# Patient Record
Sex: MALE | Race: WHITE | NOT HISPANIC OR LATINO | Employment: OTHER
[De-identification: names, ages, dates, MRNs, and addresses within clinical notes are randomized per-mention and may not be internally consistent; named-entity substitution may affect disease eponyms.]

---

## 2018-08-09 ENCOUNTER — RECORDS - HEALTHEAST (OUTPATIENT)
Dept: ADMINISTRATIVE | Facility: OTHER | Age: 78
End: 2018-08-09

## 2018-08-16 ENCOUNTER — HOSPITAL ENCOUNTER (OUTPATIENT)
Dept: RADIOLOGY | Facility: HOSPITAL | Age: 78
Discharge: HOME | End: 2018-08-16
Attending: OTOLARYNGOLOGY

## 2018-08-16 DIAGNOSIS — R13.10 DYSPHAGIA, UNSPECIFIED TYPE: ICD-10-CM

## 2018-08-16 DIAGNOSIS — Z85.818 PERSONAL HISTORY OF MALIGNANT NEOPLASM OF OTHER SITES OF LIP, ORAL CAVITY, AND PHARYNX: ICD-10-CM

## 2021-05-28 ENCOUNTER — RECORDS - HEALTHEAST (OUTPATIENT)
Dept: ADMINISTRATIVE | Facility: CLINIC | Age: 81
End: 2021-05-28

## 2021-05-29 ENCOUNTER — RECORDS - HEALTHEAST (OUTPATIENT)
Dept: ADMINISTRATIVE | Facility: CLINIC | Age: 81
End: 2021-05-29

## 2021-06-19 NOTE — PROGRESS NOTES
"Speech Language/Pathology  Videofluoroscopic Swallow Study       Problem:  There is no problem list on file for this patient.      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 Patient reports that he \"gags a lot\" when eating. He is able to swallow without difficulty one day, and then can have significant trouble the next. Per his wife, \"Some days, he can't get anything down. It's very frustrating.\" When asked about his eating behaviors, wife comments that patient eats fast and takes large bites. He seems to do better with softer, moister foods (e.g., soups). Foods like breads and salads are especially problematic. Patient also coughs at times when taking pills. The purpose of this study is to evaluate the oropharyngeal phase of patient's swallow, determine his aspiration risk, and establish safe swallowing strategies as appropriate. Patient also participated in an esophagram during this visit. Please refer to separate report for details.    Patient presents as alert and cooperative during this evaluation, but was disengaged at times (i.e., looking around room, not attending to therapist). His wife, Ena, observed study and was present afterwards to receive education regarding results and recommendations.    An  was not applicable    Patient was given honey-thick, puree, nectar-thick, and thin consistencies of barium.    Oral Phase:    Dentition/Oral hygiene: Patient wears upper and lower partials. Oral hygiene was adequate.    Bolus prep and oral control were mildly impaired. Brief bolus holding was noted with all consistencies. During this pause, patient appeared to be preparing himself to initiate the swallow. " Intermittent piecemeal deglutition noted with the more viscous consistencies (i.e., honey-thick and puree).     Anterior-Posterior transit was mildly impaired and effortful with puree consistency.    Premature spillage occurred with honey-thick liquid.    Tongue base retraction was mildly impaired.    Mild oral stasis was noted along the tongue base with all consistencies. This was most notable following boluses of puree, but cleared when patient completed spontaneous dry swallows.     Pharyngeal Phase:    Aspiration did not occur with any consistency.    Laryngeal penetration did not occur with any consistency.    Swallow response was timely with all consistencies. Pourover was not observed.    Epiglottic movement was complete consistently across texture trials.    Mild pharyngeal stasis was observed in the valleculae following boluses of each consistency. Again, this was most notable following boluses of puree, but cleared with throat clearing and spontaneous dry swallows.    Pharyngeal constriction was not impaired.    Hyolaryngeal elevation was not impaired. Hyolaryngeal excursion was mildly reduced.    Cricopharyngeal function was not impaired, though patient was noted to have a mildly prominent cricopharyngeus muscle. Cervical esophageal function was not impaired.    Assessment:    Patient demonstrated mild oral dysphagia. Suspect this is a delayed side effect of prior chemoradiation therapy for tonsil cancer. No significant pharyngeal dysphagia was observed.    Patient was noted to frequently clear his throat throughout study. This appeared to be his way of clearing stasis along the base of the tongue and in the valleculae. Throat clearing was not related to aspiration or laryngeal penetration, as neither was observed during this evaluation.    Patient is at low aspiration risk with all intake.    Rehab potential is fair to good based on prior level of function, evaluation results and motivation and  cooperation.    Recommendations:    Plan: Continue current diet of Regular textures and thin liquids. Recommend that patient choose softer foods, moistened with sauces, gravies, dressings, or condiments.    Strategies: Patient to sit fully upright for all intake, eat slowly, and take one small (~1 tsp) bite or sip at a time. Patient to also alternate a bite of food with a sip of liquid. Patient to avoid talking and other distractions (e.g., watching TV) when eating and drinking. Recommend that patient also take pills one at a time, mixed in puree (e.g., applesauce, yogurt, pudding). Patient was provided with verbal education and a written handout on these strategies.    Speech Therapy is not recommended at this time    Referrals: Patient to continue to follow with [facility] as needed    Reviewed history of swallow problem with patient and wife, and verbally explained roles of SLP and radiologist. Verbally explained process of VFSS prior to administration of barium. Verbally explained results and recommendations to patient and wife. SLP answered questions and stated that a copy of this report will be faxed to patient's referring provider, Dr. Dorman of [facility]. Patient and wife verbalized understanding.    30 dysphagia minutes    Callie Foreman MA, CCC-SLP

## 2023-04-13 ENCOUNTER — APPOINTMENT (OUTPATIENT)
Dept: CT IMAGING | Facility: CLINIC | Age: 83
End: 2023-04-13
Attending: FAMILY MEDICINE
Payer: COMMERCIAL

## 2023-04-13 ENCOUNTER — HOSPITAL ENCOUNTER (EMERGENCY)
Facility: CLINIC | Age: 83
Discharge: HOME | End: 2023-04-13
Attending: FAMILY MEDICINE | Admitting: FAMILY MEDICINE
Payer: COMMERCIAL

## 2023-04-13 VITALS
HEIGHT: 65 IN | SYSTOLIC BLOOD PRESSURE: 158 MMHG | BODY MASS INDEX: 26.66 KG/M2 | DIASTOLIC BLOOD PRESSURE: 83 MMHG | HEART RATE: 72 BPM | WEIGHT: 160 LBS | TEMPERATURE: 97.9 F | OXYGEN SATURATION: 97 %

## 2023-04-13 DIAGNOSIS — R10.84 ABDOMINAL PAIN, GENERALIZED: ICD-10-CM

## 2023-04-13 PROBLEM — G43.909 MIGRAINE HEADACHE: Status: ACTIVE | Noted: 2023-04-13

## 2023-04-13 PROBLEM — K21.9 GASTROESOPHAGEAL REFLUX DISEASE: Status: ACTIVE | Noted: 2023-04-13

## 2023-04-13 PROBLEM — K44.9 DIAPHRAGMATIC HERNIA: Status: ACTIVE | Noted: 2023-04-13

## 2023-04-13 PROBLEM — C09.9 MALIGNANT NEOPLASM OF TONSIL (H): Status: ACTIVE | Noted: 2023-04-13

## 2023-04-13 PROBLEM — G47.33 OSA (OBSTRUCTIVE SLEEP APNEA): Status: ACTIVE | Noted: 2017-10-25

## 2023-04-13 PROBLEM — R33.8 ACUTE URINARY RETENTION: Status: ACTIVE | Noted: 2018-11-05

## 2023-04-13 LAB
ALBUMIN SERPL BCG-MCNC: 4.3 G/DL (ref 3.5–5.2)
ALBUMIN UR-MCNC: NEGATIVE MG/DL
ALP SERPL-CCNC: 88 U/L (ref 40–129)
ALT SERPL W P-5'-P-CCNC: 16 U/L (ref 10–50)
ANION GAP SERPL CALCULATED.3IONS-SCNC: 9 MMOL/L (ref 7–15)
APPEARANCE UR: CLEAR
AST SERPL W P-5'-P-CCNC: 22 U/L (ref 10–50)
BASOPHILS # BLD AUTO: 0 10E3/UL (ref 0–0.2)
BASOPHILS NFR BLD AUTO: 0 %
BILIRUB SERPL-MCNC: 0.4 MG/DL
BILIRUB UR QL STRIP: NEGATIVE
BUN SERPL-MCNC: 21.1 MG/DL (ref 8–23)
CALCIUM SERPL-MCNC: 9.3 MG/DL (ref 8.8–10.2)
CHLORIDE SERPL-SCNC: 108 MMOL/L (ref 98–107)
COLOR UR AUTO: ABNORMAL
CREAT SERPL-MCNC: 1.07 MG/DL (ref 0.67–1.17)
DEPRECATED HCO3 PLAS-SCNC: 27 MMOL/L (ref 22–29)
EOSINOPHIL # BLD AUTO: 0.2 10E3/UL (ref 0–0.7)
EOSINOPHIL NFR BLD AUTO: 3 %
ERYTHROCYTE [DISTWIDTH] IN BLOOD BY AUTOMATED COUNT: 12.9 % (ref 10–15)
GFR SERPL CREATININE-BSD FRML MDRD: 70 ML/MIN/1.73M2
GLUCOSE SERPL-MCNC: 102 MG/DL (ref 70–99)
GLUCOSE UR STRIP-MCNC: NEGATIVE MG/DL
HCT VFR BLD AUTO: 41.1 % (ref 40–53)
HGB BLD-MCNC: 13 G/DL (ref 13.3–17.7)
HGB UR QL STRIP: NEGATIVE
IMM GRANULOCYTES # BLD: 0 10E3/UL
IMM GRANULOCYTES NFR BLD: 0 %
KETONES UR STRIP-MCNC: NEGATIVE MG/DL
LACTATE SERPL-SCNC: 0.7 MMOL/L (ref 0.7–2)
LEUKOCYTE ESTERASE UR QL STRIP: NEGATIVE
LIPASE SERPL-CCNC: 25 U/L (ref 13–60)
LYMPHOCYTES # BLD AUTO: 1.4 10E3/UL (ref 0.8–5.3)
LYMPHOCYTES NFR BLD AUTO: 21 %
MCH RBC QN AUTO: 29.8 PG (ref 26.5–33)
MCHC RBC AUTO-ENTMCNC: 31.6 G/DL (ref 31.5–36.5)
MCV RBC AUTO: 94 FL (ref 78–100)
MONOCYTES # BLD AUTO: 0.7 10E3/UL (ref 0–1.3)
MONOCYTES NFR BLD AUTO: 11 %
MUCOUS THREADS #/AREA URNS LPF: PRESENT /LPF
NEUTROPHILS # BLD AUTO: 4.4 10E3/UL (ref 1.6–8.3)
NEUTROPHILS NFR BLD AUTO: 65 %
NITRATE UR QL: NEGATIVE
NRBC # BLD AUTO: 0 10E3/UL
NRBC BLD AUTO-RTO: 0 /100
PH UR STRIP: 8 [PH] (ref 5–7)
PLATELET # BLD AUTO: 144 10E3/UL (ref 150–450)
POTASSIUM SERPL-SCNC: 4.2 MMOL/L (ref 3.4–5.3)
PROT SERPL-MCNC: 7 G/DL (ref 6.4–8.3)
RBC # BLD AUTO: 4.36 10E6/UL (ref 4.4–5.9)
RBC URINE: 3 /HPF
SODIUM SERPL-SCNC: 144 MMOL/L (ref 136–145)
SP GR UR STRIP: 1.02 (ref 1–1.03)
UROBILINOGEN UR STRIP-MCNC: NORMAL MG/DL
WBC # BLD AUTO: 6.7 10E3/UL (ref 4–11)
WBC URINE: <1 /HPF

## 2023-04-13 PROCEDURE — 85025 COMPLETE CBC W/AUTO DIFF WBC: CPT | Performed by: FAMILY MEDICINE

## 2023-04-13 PROCEDURE — 36415 COLL VENOUS BLD VENIPUNCTURE: CPT | Performed by: FAMILY MEDICINE

## 2023-04-13 PROCEDURE — 250N000009 HC RX 250: Performed by: FAMILY MEDICINE

## 2023-04-13 PROCEDURE — 81001 URINALYSIS AUTO W/SCOPE: CPT | Performed by: FAMILY MEDICINE

## 2023-04-13 PROCEDURE — 80053 COMPREHEN METABOLIC PANEL: CPT | Performed by: FAMILY MEDICINE

## 2023-04-13 PROCEDURE — 93010 ELECTROCARDIOGRAM REPORT: CPT | Performed by: FAMILY MEDICINE

## 2023-04-13 PROCEDURE — 93005 ELECTROCARDIOGRAM TRACING: CPT | Performed by: FAMILY MEDICINE

## 2023-04-13 PROCEDURE — 99284 EMERGENCY DEPT VISIT MOD MDM: CPT | Mod: 25 | Performed by: FAMILY MEDICINE

## 2023-04-13 PROCEDURE — 83690 ASSAY OF LIPASE: CPT | Performed by: FAMILY MEDICINE

## 2023-04-13 PROCEDURE — 74177 CT ABD & PELVIS W/CONTRAST: CPT

## 2023-04-13 PROCEDURE — 250N000011 HC RX IP 250 OP 636: Performed by: FAMILY MEDICINE

## 2023-04-13 PROCEDURE — 99285 EMERGENCY DEPT VISIT HI MDM: CPT | Mod: 25 | Performed by: FAMILY MEDICINE

## 2023-04-13 PROCEDURE — 250N000013 HC RX MED GY IP 250 OP 250 PS 637: Performed by: FAMILY MEDICINE

## 2023-04-13 PROCEDURE — 83605 ASSAY OF LACTIC ACID: CPT | Performed by: FAMILY MEDICINE

## 2023-04-13 RX ORDER — IOPAMIDOL 755 MG/ML
71 INJECTION, SOLUTION INTRAVASCULAR ONCE
Status: COMPLETED | OUTPATIENT
Start: 2023-04-13 | End: 2023-04-13

## 2023-04-13 RX ORDER — ACETAMINOPHEN 325 MG/1
650 TABLET ORAL ONCE
Status: COMPLETED | OUTPATIENT
Start: 2023-04-13 | End: 2023-04-13

## 2023-04-13 RX ADMIN — ACETAMINOPHEN 650 MG: 325 TABLET, FILM COATED ORAL at 12:24

## 2023-04-13 RX ADMIN — IOPAMIDOL 71 ML: 755 INJECTION, SOLUTION INTRAVENOUS at 12:57

## 2023-04-13 RX ADMIN — SODIUM CHLORIDE 59 ML: 9 INJECTION, SOLUTION INTRAVENOUS at 12:57

## 2023-04-13 ASSESSMENT — ENCOUNTER SYMPTOMS
FLANK PAIN: 1
HEADACHES: 0
NAUSEA: 0
DYSURIA: 0
ABDOMINAL PAIN: 0
VOMITING: 0
FEVER: 0
CONSTIPATION: 0
DIARRHEA: 0
WHEEZING: 0
CHILLS: 0
DIAPHORESIS: 0
BLOOD IN STOOL: 0
SHORTNESS OF BREATH: 0
COUGH: 0
SINUS PRESSURE: 0
PALPITATIONS: 0
FREQUENCY: 0
SORE THROAT: 0

## 2023-04-13 ASSESSMENT — ACTIVITIES OF DAILY LIVING (ADL): ADLS_ACUITY_SCORE: 35

## 2023-04-13 NOTE — ED NOTES
"Pt states that his abdominal pain has become \"a lot sharper\" within the past few weeks, waking him up at night. Pt also reports R sided flank pain that radiates to RUQ. Pt reports occasional nausea that occurs randomly but denies at this time. Pt denies diarrhea or urinary symptoms.     Pt came because \"all I came in for is a cat scan\".   "

## 2023-04-13 NOTE — DISCHARGE INSTRUCTIONS
ICD-10-CM    1. Abdominal pain, generalized  R10.84     no serious findings on imaging.  I suspect this is a result of stool accumulation and recommned a bowel regimen - taking miralax 1 capful in 8 oiz fluid daily for 7 days.  stay hydrated with 64 oz fluid/day

## 2023-04-13 NOTE — ED TRIAGE NOTES
LLQ and flank pain that is achy in nature with on/off sharp pain.  Pain for more than 2 months.  No n//v/d.  No fevers.  Pt concerned now because his brother was dx with kidney cancer.      Triage Assessment     Row Name 04/13/23 1103       Triage Assessment (Adult)    Airway WDL WDL       Respiratory WDL    Respiratory WDL WDL       Skin Circulation/Temperature WDL    Skin Circulation/Temperature WDL WDL       Cardiac WDL    Cardiac WDL WDL       Peripheral/Neurovascular WDL    Peripheral Neurovascular WDL WDL       Cognitive/Neuro/Behavioral WDL    Cognitive/Neuro/Behavioral WDL WDL

## 2023-04-13 NOTE — ED PROVIDER NOTES
History     Chief Complaint   Patient presents with     Abdominal Pain     HPI  Lalo Patel Sr. is a 82 year old male who presents with a history of urinary retention obstructive sleep apnea coronary disease prior tonsillar malignancy.  He presents here with 3 to 4 months of left lower quadrant and flank pain worsening.  Moderate pain on arrival here no associated nausea vomiting.  No changes in stools no diarrhea constipation no blood in the stool black tarry stools.  No hematemesis.  No dysuria urgency frequency or hematuria.  No trauma.  No known diverticulitis.  No known history of AAA.      Allergies:  No Known Allergies    Problem List:    Patient Active Problem List    Diagnosis Date Noted     Diaphragmatic hernia 04/13/2023     Priority: Medium     Gastroesophageal reflux disease 04/13/2023     Priority: Medium     Oct 22, 2002 Entered By: EMELINA PINEDA Comment: hiatal hernia       Malignant neoplasm of tonsil (H) 04/13/2023     Priority: Medium     Migraine headache 04/13/2023     Priority: Medium     Acute urinary retention 11/05/2018     Priority: Medium     NICO (obstructive sleep apnea) 10/25/2017     Priority: Medium     Coronary artery disease involving native coronary artery of native heart with angina pectoris (H) 04/05/2016     Priority: Medium        Past Medical History:    No past medical history on file.    Past Surgical History:    Past Surgical History:   Procedure Laterality Date     CORONARY STENT PLACEMENT       IR MISCELLANEOUS PROCEDURE  2/11/2005     IR MISCELLANEOUS PROCEDURE  2/11/2005     IR MISCELLANEOUS PROCEDURE  2/25/2005     IR MISCELLANEOUS PROCEDURE  11/3/2005     SKIN CANCER EXCISION       TONSILLECTOMY       WISDOM TOOTH EXTRACTION         Family History:    Family History   Problem Relation Age of Onset     Diabetes Mother      Glaucoma Father      Renal tubular acidosis Brother      Hypertension Son      Hernia Brother      Diabetes Brother      Hypertension Daughter   "    Migraines Daughter        Social History:  Marital Status:   [2]  Social History     Tobacco Use     Smoking status: Former     Types: Cigarettes     Quit date: 1988     Years since quittin.7     Smokeless tobacco: Never   Substance Use Topics     Alcohol use: No     Drug use: No        Medications:    levothyroxine (SYNTHROID, LEVOTHROID) 75 MCG tablet  metoprolol succinate (TOPROL-XL) 50 MG 24 hr tablet  omeprazole (PRILOSEC) 20 MG capsule  ranitidine (ZANTAC) 150 MG capsule          Review of Systems   Constitutional: Negative for chills, diaphoresis and fever.   HENT: Negative for ear pain, sinus pressure and sore throat.    Eyes: Negative for visual disturbance.   Respiratory: Negative for cough, shortness of breath and wheezing.    Cardiovascular: Positive for chest pain. Negative for palpitations.   Gastrointestinal: Negative for abdominal pain, blood in stool, constipation, diarrhea, nausea and vomiting.   Genitourinary: Positive for flank pain. Negative for dysuria, frequency and urgency.   Skin: Negative for rash.   Neurological: Negative for headaches.   All other systems reviewed and are negative.      Physical Exam   BP: (!) 151/75  Pulse: 70  Temp: 97.9  F (36.6  C)  Height: 165.1 cm (5' 5\")  Weight: 72.6 kg (160 lb)  SpO2: 98 %      Physical Exam  Constitutional:       General: He is in acute distress.      Appearance: He is not diaphoretic.   HENT:      Head: Atraumatic.   Cardiovascular:      Rate and Rhythm: Normal rate and regular rhythm.      Heart sounds: No murmur heard.  Pulmonary:      Effort: No respiratory distress.      Breath sounds: No stridor. No wheezing or rhonchi.   Abdominal:      General: Abdomen is flat. There is no distension.      Palpations: There is no mass.      Tenderness: There is abdominal tenderness. There is left CVA tenderness. There is no right CVA tenderness or guarding.   Musculoskeletal:      Cervical back: Neck supple.      Right lower leg: No " edema.      Left lower leg: No edema.   Skin:     Coloration: Skin is not pale.      Findings: No rash.   Neurological:      Mental Status: He is alert.         ED Course                 Procedures                EKG Interpretation:      Interpreted by Patel Vargas MD  EKG done at 1235 hrs. demonstrates a sinus rhythm 71 bpm normal axis.  There is no ST change.  There is no T wave change.  Normal R progression and no Q waves.  Normal intervals.  Normal conduction.  Are several PVCs.  Impression sinus rhythm 71 bpm no acute change    Critical Care time:  none               Results for orders placed or performed during the hospital encounter of 04/13/23 (from the past 24 hour(s))   CBC with platelets differential    Narrative    The following orders were created for panel order CBC with platelets differential.  Procedure                               Abnormality         Status                     ---------                               -----------         ------                     CBC with platelets and d...[[id number]]  Abnormal            Final result                 Please view results for these tests on the individual orders.   Comprehensive metabolic panel   Result Value Ref Range    Sodium 144 136 - 145 mmol/L    Potassium 4.2 3.4 - 5.3 mmol/L    Chloride 108 (H) 98 - 107 mmol/L    Carbon Dioxide (CO2) 27 22 - 29 mmol/L    Anion Gap 9 7 - 15 mmol/L    Urea Nitrogen 21.1 8.0 - 23.0 mg/dL    Creatinine 1.07 0.67 - 1.17 mg/dL    Calcium 9.3 8.8 - 10.2 mg/dL    Glucose 102 (H) 70 - 99 mg/dL    Alkaline Phosphatase 88 40 - 129 U/L    AST 22 10 - 50 U/L    ALT 16 10 - 50 U/L    Protein Total 7.0 6.4 - 8.3 g/dL    Albumin 4.3 3.5 - 5.2 g/dL    Bilirubin Total 0.4 <=1.2 mg/dL    GFR Estimate 70 >60 mL/min/1.73m2   Lipase   Result Value Ref Range    Lipase 25 13 - 60 U/L   Lactic acid whole blood   Result Value Ref Range    Lactic Acid 0.7 0.7 - 2.0 mmol/L   CBC with platelets and differential   Result Value Ref Range     WBC Count 6.7 4.0 - 11.0 10e3/uL    RBC Count 4.36 (L) 4.40 - 5.90 10e6/uL    Hemoglobin 13.0 (L) 13.3 - 17.7 g/dL    Hematocrit 41.1 40.0 - 53.0 %    MCV 94 78 - 100 fL    MCH 29.8 26.5 - 33.0 pg    MCHC 31.6 31.5 - 36.5 g/dL    RDW 12.9 10.0 - 15.0 %    Platelet Count 144 (L) 150 - 450 10e3/uL    % Neutrophils 65 %    % Lymphocytes 21 %    % Monocytes 11 %    % Eosinophils 3 %    % Basophils 0 %    % Immature Granulocytes 0 %    NRBCs per 100 WBC 0 <1 /100    Absolute Neutrophils 4.4 1.6 - 8.3 10e3/uL    Absolute Lymphocytes 1.4 0.8 - 5.3 10e3/uL    Absolute Monocytes 0.7 0.0 - 1.3 10e3/uL    Absolute Eosinophils 0.2 0.0 - 0.7 10e3/uL    Absolute Basophils 0.0 0.0 - 0.2 10e3/uL    Absolute Immature Granulocytes 0.0 <=0.4 10e3/uL    Absolute NRBCs 0.0 10e3/uL   CT Abdomen Pelvis w Contrast    Narrative    CT ABDOMEN AND PELVIS WITH CONTRAST 4/13/2023 1:11 PM    CLINICAL HISTORY: Left lower quadrant abdominal pain, progressive.    TECHNIQUE: CT scan of the abdomen and pelvis was performed following  injection of IV contrast. Multiplanar reformats were obtained. Dose  reduction techniques were used.  CONTRAST: 71 mL Isovue 370    COMPARISON: None.    FINDINGS:   LOWER CHEST: Normal.    HEPATOBILIARY: Normal.    PANCREAS: Normal.    SPLEEN: Normal.    ADRENAL GLANDS: Normal.    KIDNEYS/BLADDER: Benign renal cysts. No hydronephrosis or urinary  tract calculi.    BOWEL: Sigmoid colonic diverticulosis without signs of diverticulitis.  Normal appendix.    PELVIC ORGANS: Normal.    ADDITIONAL FINDINGS: None.    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 Small fat-containing umbilical and right inguinal hernias.      Impression    IMPRESSION:   1.  No acute findings or specific abnormality to explain the patient's  abdominal pain.   UA with Microscopic reflex to Culture    Specimen: Urine, Clean Catch   Result Value Ref Range    Color Urine Straw Colorless, Straw, Light Yellow,  Yellow    Appearance Urine Clear Clear    Glucose Urine Negative Negative mg/dL    Bilirubin Urine Negative Negative    Ketones Urine Negative Negative mg/dL    Specific Gravity Urine 1.021 1.003 - 1.035    Blood Urine Negative Negative    pH Urine 8.0 (H) 5.0 - 7.0    Protein Albumin Urine Negative Negative mg/dL    Urobilinogen Urine Normal Normal, 2.0 mg/dL    Nitrite Urine Negative Negative    Leukocyte Esterase Urine Negative Negative    Mucus Urine Present (A) None Seen /LPF    RBC Urine 3 (H) <=2 /HPF    WBC Urine <1 <=5 /HPF    Narrative    Urine Culture not indicated       Medications   iopamidol (ISOVUE-370) solution 71 mL (has no administration in time range)   sodium chloride 0.9 % bag 500mL for CT scan flush use (has no administration in time range)   acetaminophen (TYLENOL) tablet 650 mg (has no administration in time range)       Assessments & Plan (with Medical Decision Making)     MDM: Lalo Patel . is a 82 year old male presents with flank pain and abdominal pain onset over the last several months but worse now.  Moderate pain on arrival here.  Does not want anything that will sedate him as he plans to drive home.  He has not yet taken Tylenol today.  He has no history of AAA a ureteral lithiasis, pelvic malignancy but he is worried about a possible abdominal malignancy due to his brother passing away from renal cell carcinoma relatively recently.  Check broad-based testing including CT of the abdomen pelvis to exclude diverticulitis ureteral stone, mass.  Evaluate with CBC comprehensive panel lipase urinalysis lactic acid for ischemic bowel.  Check EKG for A-fib    Testing is reassuring.  And I did not find a serious cause for his current symptoms.  These apparently been ongoing for months and have been worse now.  May need colonoscopy but at this point would recommend a bowel regimen.  The imaging demonstrates no significant findings.  Discussed discharge home with follow-up with primary  care.    The patient's pain appears to be clearly in the abdomen and in the flank.  But we did discuss that should his pain be in the chest especially with associated cardiopulmonary symptoms he needs to return as that would require different evaluation that was performed today.    I have reviewed the nursing notes.    I have reviewed the findings, diagnosis, plan and need for follow up with the patient.           Medical Decision Making  The patient's presentation was of moderate complexity (an undiagnosed new problem with uncertain diagnosis).    The patient's evaluation involved:  ordering and/or review of 3+ test(s) in this encounter (see separate area of note for details)    The patient's management necessitated only low risk treatment.        New Prescriptions    No medications on file       Final diagnoses:   Abdominal pain, generalized - no serious findings on imaging.  I suspect this is a result of stool accumulation and recommned a bowel regimen - taking miralax 1 capful in 8 oiz fluid daily for 7 days.  stay hydrated with 64 oz fluid/day       4/13/2023   [facility] EMERGENCY DEPT     Patel Vargas MD  04/13/23 1526       Patel Vargas MD  04/13/23 1523